# Patient Record
Sex: MALE | HISPANIC OR LATINO | Employment: FULL TIME | URBAN - METROPOLITAN AREA
[De-identification: names, ages, dates, MRNs, and addresses within clinical notes are randomized per-mention and may not be internally consistent; named-entity substitution may affect disease eponyms.]

---
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
## 2024-11-09 ENCOUNTER — APPOINTMENT (OUTPATIENT)
Dept: GENERAL RADIOLOGY | Facility: CLINIC | Age: 24
End: 2024-11-09
Attending: EMERGENCY MEDICINE

## 2024-11-09 ENCOUNTER — HOSPITAL ENCOUNTER (EMERGENCY)
Facility: CLINIC | Age: 24
Discharge: HOME | End: 2024-11-09
Attending: EMERGENCY MEDICINE | Admitting: EMERGENCY MEDICINE

## 2024-11-09 VITALS
HEART RATE: 66 BPM | BODY MASS INDEX: 26.37 KG/M2 | HEIGHT: 67 IN | OXYGEN SATURATION: 100 % | RESPIRATION RATE: 16 BRPM | TEMPERATURE: 97.8 F | SYSTOLIC BLOOD PRESSURE: 133 MMHG | WEIGHT: 168 LBS | DIASTOLIC BLOOD PRESSURE: 98 MMHG

## 2024-11-09 DIAGNOSIS — S52.122A CLOSED DISPLACED FRACTURE OF HEAD OF LEFT RADIUS, INITIAL ENCOUNTER: ICD-10-CM

## 2024-11-09 PROCEDURE — 250N000013 HC RX MED GY IP 250 OP 250 PS 637: Performed by: EMERGENCY MEDICINE

## 2024-11-09 PROCEDURE — 73080 X-RAY EXAM OF ELBOW: CPT | Mod: LT

## 2024-11-09 PROCEDURE — 73110 X-RAY EXAM OF WRIST: CPT | Mod: LT

## 2024-11-09 PROCEDURE — 29105 APPLICATION LONG ARM SPLINT: CPT | Mod: LT

## 2024-11-09 PROCEDURE — 99284 EMERGENCY DEPT VISIT MOD MDM: CPT | Mod: 25

## 2024-11-09 RX ORDER — HYDROCODONE BITARTRATE AND ACETAMINOPHEN 5; 325 MG/1; MG/1
2 TABLET ORAL ONCE
Status: COMPLETED | OUTPATIENT
Start: 2024-11-09 | End: 2024-11-09

## 2024-11-09 RX ORDER — IBUPROFEN 600 MG/1
600 TABLET, FILM COATED ORAL ONCE
Status: COMPLETED | OUTPATIENT
Start: 2024-11-09 | End: 2024-11-09

## 2024-11-09 RX ADMIN — IBUPROFEN 600 MG: 600 TABLET ORAL at 12:33

## 2024-11-09 RX ADMIN — HYDROCODONE BITARTRATE AND ACETAMINOPHEN 2 TABLET: 5; 325 TABLET ORAL at 12:33

## 2024-11-09 ASSESSMENT — COLUMBIA-SUICIDE SEVERITY RATING SCALE - C-SSRS
1. IN THE PAST MONTH, HAVE YOU WISHED YOU WERE DEAD OR WISHED YOU COULD GO TO SLEEP AND NOT WAKE UP?: NO
6. HAVE YOU EVER DONE ANYTHING, STARTED TO DO ANYTHING, OR PREPARED TO DO ANYTHING TO END YOUR LIFE?: NO
2. HAVE YOU ACTUALLY HAD ANY THOUGHTS OF KILLING YOURSELF IN THE PAST MONTH?: NO

## 2024-11-09 ASSESSMENT — ACTIVITIES OF DAILY LIVING (ADL)
ADLS_ACUITY_SCORE: 0

## 2024-11-09 NOTE — ED PROVIDER NOTES
"  Emergency Department Note      History of Present Illness     Chief Complaint   Arm Injury and Fall      HPI   Enmanuel Leung is a 23 year old male who presents for left arm injury after slipping off back of trailer approximately 3-4 feet. Patient states he landed on left arm and has had pain to the left elbow and forearm. He had difficulty breathing after the fall but states breathing has returned to normal. No shortness of breath, chest pain or pleuritic pain now. He denies any rib pain or injuries. No neck, back, abdominal, hip, or leg pain. No pain elsewhere. No medical problems or daily medications. He is unable to move the left elbow due to pain. He can move the fingers normally. He denies any numbness, tingling, or weakness. He denies any other symptoms.     Independent Historian   None    Review of External Notes   None    Past Medical History     Medical History and Problem List   The patient does not have any pertinent past medical history.    Medications   The patient is not currently taking any prescribed medications.    Surgical History   Appendectomy     Physical Exam     Patient Vitals for the past 24 hrs:   BP Temp Temp src Pulse Resp SpO2 Height Weight   11/09/24 1124 (!) 133/98 97.8  F (36.6  C) Temporal 66 16 100 % 1.702 m (5' 7\") 76.2 kg (168 lb)     Physical Exam  General: Well appearing, nontoxic. Resting comfortably  Head:  Scalp, face, and head appear normal, atraumatic   Eyes:  Pupils equal, round    Conjunctivae noninjected and sclera white  ENT:    The nose is normal    Ears/pinnae are normal  Neck:  Normal range of motion  CV:  RRR, no M/R/G  Resp:  CTAB, no increased WOB   MSK:  Tenderness to palpation to the left elbow, proximal forearm and left wrist. Pain with any ROM of the left elbow or wrist. No deformity. CMS intact distally in all peripheral distributions. Radial pulses 2+ bilaterally. Cap refill 2sec and normal. No wounds. Compartments of the lower extremity soft and " nontender. Left shoulder, AC joint, clavicle normal. Chest wall and ribs normal and non tender to palpation.   Skin:  No rash or lesions noted.  Neuro:  Speech is normal and fluent    Moves all extremities spontaneously  Psych: Awake, Alert. Normal affect      Appropriate interactions         Diagnostics     Lab Results   Labs Ordered and Resulted from Time of ED Arrival to Time of ED Departure - No data to display    Imaging   XR Wrist Left G/E 3 Views   Final Result   IMPRESSION: There is no evidence of an acute left wrist fracture. Joint spaces are maintained.         Elbow  XR, G/E 3 views, left   Final Result   IMPRESSION: Mildly impacted and displaced intra-articular fracture of the radial head/neck. Elbow joint spaces are otherwise maintained and normally aligned. Small elbow joint effusion.      NOTE: ABNORMAL REPORT      THE DICTATION ABOVE DESCRIBES AN ABNORMALITY FOR WHICH FOLLOW-UP IS NEEDED.            Independent Interpretation   See below     ED Course      Medications Administered   Medications   HYDROcodone-acetaminophen (NORCO) 5-325 MG per tablet 2 tablet (2 tablets Oral $Given 11/9/24 1233)   ibuprofen (ADVIL/MOTRIN) tablet 600 mg (600 mg Oral $Given 11/9/24 1233)       Procedures   Procedures     Splint Placement     Procedure: Splint Placement     Indication: Fracture    Consent: Verbal     Location: Left Arm    Preparation: Standard    Procedure detail:   Splint was applied by Myself  Splint type: Long-arm posterior   Splint materilal: Fiberglass  After placement I checked and adjusted the fit as needed to ensure proper positioning/fit   Sensation and circulation are intact after splint placement     Patient Status: The patient tolerated the procedure well: Yes. There were no complications.      Discussion of Management   None    ED Course   ED Course as of 11/10/24 1002   Sat Nov 09, 2024   1217 I performed an independent interpretation of the patient's left elbow radiographs.  There is a  radial head fracture seen.  No dislocation.   1220 I obtained history and examined the patient as noted above.     1435 Patient updated regarding findings and plan of care.        Additional Documentation  None    Medical Decision Making / Diagnosis     CMS Diagnoses: None    MIPS       None    MDM   Enmanuel Leung is a 23 year old male who presents with pain to the left elbow and forearm after falling off of the back of a trailer.  He denies any other injuries.  No head injury or loss of consciousness.  Left upper extremity is neurovascularly intact without evidence of any neurovascular compromise.  No wounds.  Radiographs demonstrate a displaced radial head fracture.  No other fracture seen of the forearm or wrist.  Hand is atraumatic with normal range of motion of the fingers.  Patient was placed in a posterior long-arm splint as noted above.  I discussed with the patient that given the displaced nature of this intra-articular fracture he will require close follow-up with orthopedics and possible surgical intervention.  I recommended follow-up with orthopedics within 1 week.  Patient was provided with a sling and recommended to be nonweightbearing in the left upper extremity.  No other injuries on head to toe evaluation.  Patient is agreeable to plan of care.  Close return precautions were provided and he was discharged in stable and improved condition.    Disposition   The patient was discharged.     Diagnosis     ICD-10-CM    1. Closed displaced fracture of head of left radius, initial encounter  S52.122A            Discharge Medications   There are no discharge medications for this patient.        Scribe Disclosure:  Anjana TOBAR, am serving as a scribe at 11:54 AM on 11/9/2024 to document services personally performed by Giancarlo Lindsay MD based on my observations and the provider's statements to me.        Giancarlo Lindsay MD  11/10/24 [id number]

## 2024-11-09 NOTE — LETTER
November 9, 2024      To Whom It May Concern:      Enmaneul Leung was seen in our Emergency Department today, 11/09/24. He suffered an injury to the left elbow. He may not use his left arm or hand for any work activities until cleared by his orthopedic specialist. He may return to work with these accommodations.      Sincerely,        Giancarlo Lindsay MD

## 2024-11-09 NOTE — ED TRIAGE NOTES
Pt was at work, placing a tarp on top of a trailer when his foot slipped and he fell to the concrete from approx 5ft high. Pt landed on left elbow. Denies hitting head, no neck or back pain.      Triage Assessment (Adult)       Row Name 11/09/24 1125          Triage Assessment    Airway WDL WDL        Respiratory WDL    Respiratory WDL WDL        Skin Circulation/Temperature WDL    Skin Circulation/Temperature WDL WDL        Cardiac WDL    Cardiac WDL WDL        Peripheral/Neurovascular WDL    Peripheral Neurovascular WDL WDL        Cognitive/Neuro/Behavioral WDL    Cognitive/Neuro/Behavioral WDL WDL